# Patient Record
Sex: MALE | Race: OTHER | HISPANIC OR LATINO
[De-identification: names, ages, dates, MRNs, and addresses within clinical notes are randomized per-mention and may not be internally consistent; named-entity substitution may affect disease eponyms.]

---

## 2019-04-29 ENCOUNTER — APPOINTMENT (OUTPATIENT)
Dept: ORTHOPEDIC SURGERY | Facility: CLINIC | Age: 44
End: 2019-04-29
Payer: MEDICAID

## 2019-04-29 VITALS
HEART RATE: 76 BPM | HEIGHT: 67 IN | BODY MASS INDEX: 31.39 KG/M2 | DIASTOLIC BLOOD PRESSURE: 90 MMHG | SYSTOLIC BLOOD PRESSURE: 146 MMHG | WEIGHT: 200 LBS

## 2019-04-29 DIAGNOSIS — M54.16 RADICULOPATHY, LUMBAR REGION: ICD-10-CM

## 2019-04-29 DIAGNOSIS — Z87.09 PERSONAL HISTORY OF OTHER DISEASES OF THE RESPIRATORY SYSTEM: ICD-10-CM

## 2019-04-29 DIAGNOSIS — Z86.39 PERSONAL HISTORY OF OTHER ENDOCRINE, NUTRITIONAL AND METABOLIC DISEASE: ICD-10-CM

## 2019-04-29 DIAGNOSIS — Z83.3 FAMILY HISTORY OF DIABETES MELLITUS: ICD-10-CM

## 2019-04-29 PROCEDURE — 72100 X-RAY EXAM L-S SPINE 2/3 VWS: CPT

## 2019-04-29 PROCEDURE — 99203 OFFICE O/P NEW LOW 30 MIN: CPT

## 2019-04-29 PROCEDURE — 73502 X-RAY EXAM HIP UNI 2-3 VIEWS: CPT | Mod: RT

## 2019-04-29 RX ORDER — DICLOFENAC POTASSIUM 50 MG/1
50 TABLET, COATED ORAL 3 TIMES DAILY
Qty: 60 | Refills: 0 | Status: ACTIVE | COMMUNITY
Start: 2019-04-29 | End: 1900-01-01

## 2019-04-29 NOTE — DISCUSSION/SUMMARY
[de-identified] : 43-year-old male with likely acute radiculopathy right LE. Overall symptoms are improving. Options discussed. Prescribed course of diclofenac, side effects discussed. Recommended physical therapy. He will follow up with Dr. White after a course of medicine and therapy if the pain is not improving. All questions answered he expresses understanding

## 2019-04-29 NOTE — PHYSICAL EXAM
[de-identified] : General Exam\par \par Well developed, well nourished\par No apparent distress\par Oriented to person, place, and time\par Mood: Normal\par Affect: Normal\par Balance and coordination: Normal\par Gait: Normal\par \par Right hip exam\par \par Skin: Clean/dry and intact\par Inspection: No obvious deformity, no swelling, no ecchymosis.\par Tenderness:  no tenderness over greater trochanter/glut medius insertion. No tenderness pubic symphysis, pubic tubercle, hip flexors. No ttp ischial tuberosity or buttock. No ttp over the ASIS/Illiac crest.\par ROM: 0-120°. Internal rotation 30 external rotation 70\par Painful ROM: None\par Additional tests: No pain with circumduction negative impingement test at 90° mildly positive impingement test at 60° negative [facility]\par Strength: 5/5 hip flexion/ADD/ABD/Q/H/TA/GS/EHL\par Neuro: Sensation in tact to light touch throughout in dp/sp/tib/carlos manuel/saph distributions\par Pulses: 2+ DP/PT pulses\par \par Lumbar Spine Exam\par \par Inspection: No lesions, no obvious deformity\par Palpation: No midline tenderness palpation, step-offs, or skin lesions. No tenderness to palpation of the sciatic notch bilaterally. no Paraspinal tenderness bilaterally\par ROM: no restricted range of motion with respect to flexion, extension, lateral bending, and rotation. \par Strength: 5/5 IP/hip abductors/hip adductors/Q/H/EHL/TA/GS\par Hip ROM: No pain with passive internal/external rotation of the hips. \par Other tests:neg straight leg raise on the left negative femoral stretch \par Sensation: Intact sensation to light touch bilateral lower extremities in L2-S1 distributions. \par Reflexes: 2+ patellar and reflexes. Downgoing\par Babinski. \par Pulses: 2+ DP and PT pulses\par  [de-identified] : 2 views L spine.  Preliminary report- no acute fx seen\par AP Pelvis lateral R hip.  Preliminary report- no acute fx seen, no degenerative changes\par

## 2019-04-29 NOTE — HISTORY OF PRESENT ILLNESS
[de-identified] : 43-year-old male presents complaining of right hip and leg pain for 9-10 days. No injuries or change in activity. He feels the pain lateral aspect right hip that radiates down his entire leg to his ankle. He describes the pain is deep inside. He has taken Advil with some relief. Massage makes it feel better. No weakness noted. Denies numbness tingling\par \par The patient's past medical history, past surgical history, medications, allergies, and social history were reviewed by me today with the patient and documented accordingly. In addition, the patient's family history, which is noncontributory to this visit, was also reviewed.\par

## 2019-05-02 PROBLEM — M54.16 RIGHT LUMBAR RADICULOPATHY: Status: ACTIVE | Noted: 2019-04-29

## 2019-05-20 ENCOUNTER — MEDICATION RENEWAL (OUTPATIENT)
Age: 44
End: 2019-05-20

## 2019-06-06 ENCOUNTER — RX RENEWAL (OUTPATIENT)
Age: 44
End: 2019-06-06

## 2019-07-08 ENCOUNTER — RX RENEWAL (OUTPATIENT)
Age: 44
End: 2019-07-08

## 2019-08-15 ENCOUNTER — RX RENEWAL (OUTPATIENT)
Age: 44
End: 2019-08-15

## 2019-09-20 ENCOUNTER — RX RENEWAL (OUTPATIENT)
Age: 44
End: 2019-09-20

## 2019-10-28 ENCOUNTER — RX RENEWAL (OUTPATIENT)
Age: 44
End: 2019-10-28

## 2019-10-29 ENCOUNTER — RX RENEWAL (OUTPATIENT)
Age: 44
End: 2019-10-29

## 2019-11-20 ENCOUNTER — RX RENEWAL (OUTPATIENT)
Age: 44
End: 2019-11-20

## 2019-12-16 ENCOUNTER — RX RENEWAL (OUTPATIENT)
Age: 44
End: 2019-12-16

## 2020-01-13 ENCOUNTER — RX RENEWAL (OUTPATIENT)
Age: 45
End: 2020-01-13

## 2020-01-13 RX ORDER — DICLOFENAC SODIUM 50 MG/1
50 TABLET, DELAYED RELEASE ORAL
Qty: 60 | Refills: 0 | Status: ACTIVE | COMMUNITY
Start: 2019-05-20 | End: 1900-01-01

## 2022-11-17 ENCOUNTER — NON-APPOINTMENT (OUTPATIENT)
Age: 47
End: 2022-11-17

## 2022-11-17 ENCOUNTER — APPOINTMENT (OUTPATIENT)
Dept: ORTHOPEDIC SURGERY | Facility: CLINIC | Age: 47
End: 2022-11-17
Payer: MEDICAID

## 2022-11-17 DIAGNOSIS — M79.642 PAIN IN LEFT HAND: ICD-10-CM

## 2022-11-17 PROCEDURE — 73130 X-RAY EXAM OF HAND: CPT | Mod: LT

## 2022-11-17 PROCEDURE — 99203 OFFICE O/P NEW LOW 30 MIN: CPT | Mod: 25

## 2022-11-17 PROCEDURE — 29075 APPL CST ELBW FNGR SHORT ARM: CPT | Mod: LT

## 2022-11-17 RX ORDER — IBUPROFEN 600 MG/1
600 TABLET, FILM COATED ORAL
Qty: 60 | Refills: 0 | Status: ACTIVE | COMMUNITY
Start: 2022-11-17 | End: 1900-01-01

## 2022-11-17 NOTE — ADDENDUM
[FreeTextEntry1] : I, Holly Quigley wrote this note acting as a scribe for Dr. Sherwin Espino on Nov 17, 2022.

## 2022-11-17 NOTE — END OF VISIT
[FreeTextEntry3] : All medical record entries made by the Scribe were at my,  Dr. Sherwin Espino MD., direction and personally dictated by me on 11/17/2022. I have personally reviewed the chart and agree that the record accurately reflects my personal performance of the history, physical exam, assessment and plan.

## 2022-11-17 NOTE — HISTORY OF PRESENT ILLNESS
[de-identified] : Patient is a RHD 48 y/o male who presents with pain in the left hand post injury on 11/10/22. Patient reports he punched a wall, and as a result sustained fracture in the left small finger. He states he went to [facility] and then [facility] ER, where x-rays were done on 11/11/22 and he was diagnosed with left 5th MCP fracture. He does not have them available for review today. He presents in ulnar gutter splint.

## 2022-11-17 NOTE — RETURN TO WORK/SCHOOL
[FreeTextEntry1] : Mr. PALLAVI LARES was seen in the office today on 11/17/2022 and evaluated by me for an Orthopedic visit. Please be advised that he will remain out of work until 11/28/2022 due to a left hand fracture. [FreeTextEntry2] : Dr. Sherwin Espino M.D. on 11/17/2022.

## 2022-11-17 NOTE — PHYSICAL EXAM
[de-identified] : Patient is WDWN, alert, and in no acute distress. Breathing is unlabored. He is grossly oriented to person, place, and time.\par \par Left Hand:\par He presents in an ulnar gutter splint, which was removed for physical exam.\par There is edema noted dorsally and ulnarly along the left hand.\par He has focal tenderness along the shaft of the fifth metacarpal in the region of the fracture.\par He cannot flex the little finger into the palm due to injury and pain. Limitations of motion at the left little finger MCP joint. \par Full digital motion otherwise.\par Sensation to the digits is intact distally. [de-identified] : AP, lateral and oblique views of the LEFT hand were obtained today and revealed a displaced and obliquely oriented fracture through the fifth metacarpal shaft.

## 2022-11-17 NOTE — DISCUSSION/SUMMARY
[de-identified] : The underlying pathophysiology was reviewed with the patient. XR films were reviewed with the patient. Discussed at length the nature of the patient’s condition. The left hand symptoms appear secondary to fifth metacarpal fracture. \par \par At this time, we discussed options including operative and nonoperative management. I told him that unless he prefers surgery, which would consist of ORIF, I would not recommend it given the nature of the injury as documented above. He is in agreement and has deferred surgery in lieu of cast immobilization. I did tel him however, that he may notice "drooping of the knuckle"/a loss of prominence of the metacarpal head given the injury however this is will not effect the overall function of his hand.\par \par He was placed into a left short arm cast in the office today on 11/17/22.\par Proper cast care instructions were reviewed with the patient.\par He was instructed on ROM exercises of the digits while casted.\par He was advised that he may use the hand for ADLs while casted.\par I recommended elevation and pumping of the hand to reduce edema if the cast begins to tighten.\par NSAIDs prn.\par \par RX: Ibuprofen 600g, BID (30 tablets).\par \par All questions answered, understanding verbalized. Patient in agreement with plan of care. Follow up in 4 weeks for cast removal and repeat xrays.

## 2022-12-15 ENCOUNTER — APPOINTMENT (OUTPATIENT)
Dept: ORTHOPEDIC SURGERY | Facility: CLINIC | Age: 47
End: 2022-12-15

## 2022-12-15 PROCEDURE — 73130 X-RAY EXAM OF HAND: CPT | Mod: LT

## 2022-12-15 PROCEDURE — 99213 OFFICE O/P EST LOW 20 MIN: CPT

## 2022-12-15 NOTE — PHYSICAL EXAM
[de-identified] : Patient is WDWN, alert, and in no acute distress. Breathing is unlabored. He is grossly oriented to person, place, and time.\par \par Left Hand:\par He presents in a left short arm cast which was removed in the office today on 12/15/22.\par There is resolved edema dorsally and ulnarly along the left hand.\par He is no longer tender along the shaft of the fifth metacarpal in the region of the fracture.\par He can flex with digits into the palm, forming a tight clenched fist which is improved as compared to his initial office visit 4 weeks ago.\par Full digital motion otherwise.\par Sensation to the digits is intact distally. [de-identified] : AP, lateral and oblique views of the LEFT hand were obtained today and revealed a displaced and obliquely oriented fracture through the fifth metacarpal shaft. The fracture is healing.

## 2022-12-15 NOTE — DISCUSSION/SUMMARY
[de-identified] : The underlying pathophysiology was reviewed with the patient. XR films were reviewed with the patient. Discussed at length the nature of the patient’s condition. The left hand symptoms appear secondary to fifth metacarpal fracture. \par \par At this time, the left short arm cast was removed in the office today on 12/15/22. He was instructed to use a wrist brace with any heavy work/lifting as the fracture is not yet fully healed but is healed enough that he does not require another cast. He was instructed to soak the hand in warm water and Epsom salts and perform ROM exercises of the digits. I recommended he use the hand for ADLs as tolerated to aide in regaining function and motion.\par \par RX: Left carpal tunnel splint. \par \par All questions answered, understanding verbalized. Patient in agreement with plan of care. Follow up in 6 weeks for repeat xrays, if needed.

## 2022-12-15 NOTE — ADDENDUM
[FreeTextEntry1] : I, Holly Quigley wrote this note acting as a scribe for Dr. Sherwin Espino on Dec 15, 2022.

## 2022-12-15 NOTE — END OF VISIT
[FreeTextEntry3] : All medical record entries made by the Scribe were at my,  Dr. Sherwin Espino MD., direction and personally dictated by me on 12/15/2022. I have personally reviewed the chart and agree that the record accurately reflects my personal performance of the history, physical exam, assessment and plan.

## 2022-12-15 NOTE — HISTORY OF PRESENT ILLNESS
[de-identified] : Patient is a RHD 48 y/o male who presents with pain in the left hand post injury on 11/10/22. Patient reports he punched a wall, and as a result sustained fracture in the left small finger. He states he went to [facility] and then [facility] ER, where x-rays were done on 11/11/22 and he was diagnosed with left 5th MCP fracture. He does not have them available for review today. He presents in ulnar gutter splint. He was seen initially in the office on 11/17/22 at which time he was placed into a short arm cast. He returns for cast removal and repeat xrays on 12/15/22. He is notes mild residual soreness at the end of the day when working but overall is doing well.\par \par He no longer smokes cigarettes. He states he stopped on Thanksgiving.

## 2023-02-06 ENCOUNTER — APPOINTMENT (OUTPATIENT)
Dept: ORTHOPEDIC SURGERY | Facility: CLINIC | Age: 48
End: 2023-02-06
Payer: MEDICAID

## 2023-02-06 PROCEDURE — 73130 X-RAY EXAM OF HAND: CPT | Mod: LT

## 2023-02-06 PROCEDURE — 99213 OFFICE O/P EST LOW 20 MIN: CPT

## 2023-02-06 NOTE — END OF VISIT
[FreeTextEntry3] : All medical record entries made by the Scribe were at my,  Dr. Sherwin Espino MD., direction and personally dictated by me on 02/06/2023. I have personally reviewed the chart and agree that the record accurately reflects my personal performance of the history, physical exam, assessment and plan.

## 2023-02-06 NOTE — DISCUSSION/SUMMARY
[de-identified] : The underlying pathophysiology was reviewed with the patient. XR films were reviewed with the patient. Discussed at length the nature of the patient’s condition. The left hand symptoms appear secondary to fifth metacarpal fracture. The left hand/wrist symptoms are secondary to carpal tunnel syndrome.\par \par With regard to the left fifth metacarpal fracture, I did tell him that the prominence along the metacarpal shaft dorsally is due to position that the fracture healed in as it was displaced. Again, we spoke on treatment options and I told him that if he is grossly bothered by the appearance of the hand and feels function deficits, he can always undergo a corrective osteotomy, which he deferred. I advised him to attend hand therapy to improve motion and function.\par The patient wishes to proceed with hand therapy of the left hand. A script was given.\par \par With regard to the left hand numbness and tingling, I recommended use of a wrist brace as well as an EMG for further evaluation. \par An EMG/NCV test of the upper extremities was ordered to evaluate for left carpal tunnel syndrome.. Patient will follow-up to review the results and discuss further treatment recommendations.\par \par Finally, with regard to the right elbow, I recommended use of oral and topical antiinflammatories. He was instructed on stretching exercises of the wrist and forearm as well as to attend physical therapy.\par The patient wishes to proceed with physical therapy of the right elbow. A script was given.\par \par All questions answered, understanding verbalized. Patient in agreement with plan of care. Follow up to review EMG results.

## 2023-02-06 NOTE — HISTORY OF PRESENT ILLNESS
[de-identified] : Patient is a RHD 46 y/o male who presents with pain in the left hand post injury on 11/10/22. Patient reports he punched a wall, and as a result sustained fracture in the left small finger. He states he went to [facility] and then [facility] ER, where x-rays were done on 11/11/22 and he was diagnosed with left 5th MCP fracture. He does not have them available for review today. He presents in ulnar gutter splint. He was seen initially in the office on 11/17/22 at which time he was placed into a short arm cast. He returned for cast removal and repeat xrays on 12/15/22. He noted mild residual soreness at the end of the day when working but overall was doing well. He again returns for repeat xrays on 2/6/23. He notes he is concerned about the appearance of the hand, as there is a prominence dorsally. He also states the finger feels a bit stiff. He also complains of numbness to the digits of his left hand along the median nerve distribution. He is not woken up at night by his symptoms. \par \par He additionally complains of right elbow pain which began a few weeks ago. He denies injury. He localizes his symptoms laterally to the elbow. He states his job requires repetitive work and motion.\par \par He no longer smokes cigarettes. He states he stopped on Thanksgiving.

## 2023-02-06 NOTE — PHYSICAL EXAM
[de-identified] : Patient is WDWN, alert, and in no acute distress. Breathing is unlabored. He is grossly oriented to person, place, and time.\par \par Left Hand:\par There is resolved edema dorsally and ulnarly along the left hand.\par He is no longer tender along the shaft of the fifth metacarpal in the region of the fracture.\par There is a prominence dorsally along the shaft of the fifth metacarpal. \par He can flex with digits into the palm.\par Full digital motion otherwise.\par Phalen's Test: Positive\par Tinel's Test: Negative\par No thenar atrophy.\par \par Right Elbow:\par Inspection/Palpation: Point tenderness at ECRB insertion into lateral epicondyle. \par Range of Motion: Pain elicited with resisted wrist extension with elbow fully extended, resisted extension of the long fingers, maximal flexion of the wrist, passive wrist flexion in pronation. \par Strength: Flexion and extension 5/5\par Stability: No joint instability on provocative testing. No skin lesions or discoloration.	  [de-identified] : AP, lateral and oblique views of the LEFT hand were obtained today and revealed a displaced and obliquely oriented fracture through the fifth metacarpal shaft. The fracture is healed.

## 2023-02-06 NOTE — ADDENDUM
[FreeTextEntry1] : I, Holly Quigley wrote this note acting as a scribe for Dr. Sherwin Espino on Feb 06, 2023.

## 2023-03-30 ENCOUNTER — APPOINTMENT (OUTPATIENT)
Dept: ORTHOPEDIC SURGERY | Facility: CLINIC | Age: 48
End: 2023-03-30

## 2023-05-12 ENCOUNTER — OFFICE (OUTPATIENT)
Dept: URBAN - METROPOLITAN AREA CLINIC 104 | Facility: CLINIC | Age: 48
Setting detail: OPHTHALMOLOGY
End: 2023-05-12
Payer: COMMERCIAL

## 2023-05-12 DIAGNOSIS — E11.9: ICD-10-CM

## 2023-05-12 DIAGNOSIS — H43.813: ICD-10-CM

## 2023-05-12 DIAGNOSIS — H35.363: ICD-10-CM

## 2023-05-12 PROCEDURE — 92235 FLUORESCEIN ANGRPH MLTIFRAME: CPT | Performed by: SPECIALIST

## 2023-05-12 PROCEDURE — 92134 CPTRZ OPH DX IMG PST SGM RTA: CPT | Performed by: SPECIALIST

## 2023-05-12 PROCEDURE — 92012 INTRM OPH EXAM EST PATIENT: CPT | Performed by: SPECIALIST

## 2023-05-12 ASSESSMENT — VISUAL ACUITY
OD_BCVA: 20/25+1
OS_BCVA: 20/20

## 2023-05-12 ASSESSMENT — CONFRONTATIONAL VISUAL FIELD TEST (CVF)
OD_FINDINGS: FULL
OS_FINDINGS: FULL

## 2023-08-17 ENCOUNTER — APPOINTMENT (OUTPATIENT)
Dept: ORTHOPEDIC SURGERY | Facility: CLINIC | Age: 48
End: 2023-08-17
Payer: MEDICAID

## 2023-08-17 VITALS — WEIGHT: 210 LBS | BODY MASS INDEX: 32.96 KG/M2 | HEIGHT: 67 IN

## 2023-08-17 DIAGNOSIS — M77.11 LATERAL EPICONDYLITIS, RIGHT ELBOW: ICD-10-CM

## 2023-08-17 DIAGNOSIS — S62.307A UNSPECIFIED FRACTURE OF FIFTH METACARPAL BONE, LEFT HAND, INITIAL ENCOUNTER FOR CLOSED FRACTURE: ICD-10-CM

## 2023-08-17 PROCEDURE — 73130 X-RAY EXAM OF HAND: CPT | Mod: LT

## 2023-08-17 PROCEDURE — 99213 OFFICE O/P EST LOW 20 MIN: CPT | Mod: 25

## 2023-08-17 PROCEDURE — 73080 X-RAY EXAM OF ELBOW: CPT | Mod: RT

## 2023-08-17 NOTE — HISTORY OF PRESENT ILLNESS
[de-identified] : Pt is a RHD 48 y/o male with right elbow pain. He localizes his pain laterally. He complains of associated swelling. He has tried using ice and oral antiinflammatories as well as a tennis elbow strap, all without long term relief. He states he has tried modifying his activity, which did somewhat help, however in his line of work, he has to use his hands and cannot rest.   He additionally has been treated for a left fifth metacarpal fracture. The injury was on 11/10/22. Patient reports he punched a wall, and as a result sustained fracture in the left small finger. He states he went to [facility] and then [facility] ER, where x-rays were done on 11/11/22 and he was diagnosed with left 5th MCP fracture. He was casted for 4 weeks and the cast was removed in the office on 12/15/23. He is progressing and notes full motion. He does state he intermittently experiences swelling, however overall has no deficits.

## 2023-08-17 NOTE — END OF VISIT
[FreeTextEntry3] : All medical record entries made by the Scribe were at my,  Dr. Sherwin Espino MD., direction and personally dictated by me on 08/17/2023. I have personally reviewed the chart and agree that the record accurately reflects my personal performance of the history, physical exam, assessment and plan.

## 2023-08-17 NOTE — DISCUSSION/SUMMARY
[de-identified] : The underlying pathophysiology was reviewed with the patient. XR films were reviewed with the patient. Discussed at length the nature of the patients condition. The left hand symptoms appear secondary to fifth metacarpal fracture. The right elbow symptom are secondary to lateral epicondylitis.  With regard to the left hand, I did again tell him that the prominence along the metacarpal shaft dorsally is due to position that the fracture healed in as it was displaced. He was instructed to continue with all activities and may advance his activities as tolerated.   With regard to the right elbow, I recommended he begin a course of PT for ROM, stretching and strengthening. I advised use of an oral and topical antiinflammatory as well as a tennis elbow strap. I explained to him that I prefer to save cortisone injections as a last resort as there is a lifetime limit of 3 injections and the soft tissue structures can be further weakened by the steroid injection. I recommended use of an oral antiinflammatory and a course of PT.   The patient wishes to proceed with physical therapy of the right elbow. A script was given. RX: Voltaren 75mg, BID (60 tablets).  All questions answered, understanding verbalized. Patient in agreement with plan of care. Follow up as needed.

## 2023-08-17 NOTE — ADDENDUM
[FreeTextEntry1] : I, Holly Quigley wrote this note acting as a scribe for Dr. Sherwin Espino on Aug 17, 2023.

## 2023-08-17 NOTE — PHYSICAL EXAM
[de-identified] : Patient is WDWN, alert, and in no acute distress. Breathing is unlabored. He is grossly oriented to person, place, and time.  Left Hand: There is resolved edema dorsally and ulnarly along the left hand. He is no longer tender along the shaft of the fifth metacarpal in the region of the fracture. There is a prominence dorsally along the shaft of the fifth metacarpal. He can flex with digits into the palm. Full digital motion otherwise. Phalen's Test: Positive Tinel's Test: Negative No thenar atrophy.  Right Elbow: Inspection/Palpation: Point tenderness at ECRB insertion into lateral epicondyle. Range of Motion: Pain elicited with resisted wrist extension with elbow fully extended, resisted extension of the long fingers, maximal flexion of the wrist, passive wrist flexion in pronation. Strength: Flexion and extension 5/5 Stability: No joint instability on provocative testing. No skin lesions or discoloration. [de-identified] : AP, lateral and oblique views of the LEFT hand were obtained today and revealed a displaced and obliquely oriented fracture through the fifth metacarpal shaft. The fracture is healed.  AP, lateral and oblique views of the RIGHT elbow were obtained today and revealed no abnormalities. No acute fracture. No dislocation. Cartilage spaces are maintained.

## 2024-01-22 ENCOUNTER — RX RENEWAL (OUTPATIENT)
Age: 49
End: 2024-01-22

## 2024-05-23 ENCOUNTER — APPOINTMENT (OUTPATIENT)
Dept: ORTHOPEDIC SURGERY | Facility: CLINIC | Age: 49
End: 2024-05-23
Payer: COMMERCIAL

## 2024-05-23 VITALS
HEART RATE: 89 BPM | DIASTOLIC BLOOD PRESSURE: 85 MMHG | WEIGHT: 200 LBS | BODY MASS INDEX: 31.39 KG/M2 | SYSTOLIC BLOOD PRESSURE: 122 MMHG | HEIGHT: 67 IN

## 2024-05-23 PROCEDURE — 99203 OFFICE O/P NEW LOW 30 MIN: CPT | Mod: 25

## 2024-05-23 PROCEDURE — 73502 X-RAY EXAM HIP UNI 2-3 VIEWS: CPT | Mod: RT

## 2024-05-29 NOTE — DISCUSSION/SUMMARY
[de-identified] : At this time, due to trochanteric bursitis of the right hip, I recommend a course of physical therapy and a topical anti-inflammatory. He will be reassessed in four weeks. We will discuss the potential for a cortisone injection if he gets approval from his primary care doctor, secondary to his diabetes. Of note, because of the history of sciatica, he is being referred for a spine evaluation.

## 2024-05-29 NOTE — PHYSICAL EXAM
[de-identified] : Ambulating with a slightly antalgic to antalgic gait.  Station:  Normal.  [de-identified] : Right Hip:  Range of Motion in Degrees: 	                                                     Claimant:	          Normal:	 Flexion (Active) 	                               120 	            120-degrees	 Flexion (Passive)	                               120	            120-degrees	 Extension (Active)	                               -30	             -30-degrees	 Extension (Passive)	                       -30	             -30-degrees	 Abduction (Active)	                           45-50	           [id number]	 Abduction (Passive)	                   45-50	           24-69-[id number]	 Adduction (Active)                	           20-30	           08-51-zbazqej	 Adduction (Passive)	                   20-30	           [id number]	 Internal Rotation (Active) 	             35	            35-degrees	 Internal Rotation (Passive)	             35	            35-degrees	 External Rotation (Active)	             45	            45-degrees	 External Rotation (Passive)	             45	            45-degrees	  No tenderness with internal or external rotation or axial load.  Point tenderness over the greater trochanter with pain with resisted abduction.  Negative Trendelenburg.  No weakness to flexion, extension, abduction or adduction.  No evidence of instability.  No motor or sensory deficits.  2+ DP and PT pulses.  Skin is intact.  No scars, rashes or lesions.    Left Hip:  Range of Motion in Degrees: 	                                                     Claimant:	          Normal:	 Flexion (Active) 	                               120 	            120-degrees	 Flexion (Passive)	                               120	            120-degrees	 Extension (Active)	                               -30	             -30-degrees	 Extension (Passive)	                       -30	             -30-degrees	 Abduction (Active)	                           45-50	           45-46-[id number]	 Abduction (Passive)	                   45-50	           62-73-tvynold	 Adduction (Active)                	           20-30	           08-69-[id number]	 Adduction (Passive)	                   20-30	           50-04-gyupbsq	 Internal Rotation (Active) 	             35	            35-degrees	 Internal Rotation (Passive)	             35	            35-degrees	 External Rotation (Active)	             45	            45-degrees	 External Rotation (Passive)	             45	            45-degrees	  No tenderness with internal or external rotation or axial load.  No tenderness to palpation over the greater trochanter.  Negative Trendelenburg.  No tenderness with resisted abduction.  No weakness to flexion, extension, abduction or adduction.  No evidence of instability.  No motor or sensory deficits.  2+ DP and PT pulses.  Skin is intact.  No scars, rashes or lesions.     [de-identified] : Appearance:  Well-developed, well-nourished male in no acute distress.   [de-identified] : Radiographs, which were taken in the office today, two-three views of the right hip, including the pelvis, show no obvious osseous abnormality.

## 2024-05-29 NOTE — CONSULT LETTER
[Dear  ___] : Dear  [unfilled], [FreeTextEntry1] :  I am referring Randall Jarvis to you for further evaluation.  Thank you very much for seeing this patient. My most recent evaluation follows.  If you have any questions, please do not hesitate to contact me.  Sincerely,  Catarino Kelley III, MD [facility]/  cc: Dr. Donna Luis

## 2024-05-29 NOTE — ADDENDUM
[FreeTextEntry1] : This note was written by Gris Singh on 05/29/2024 acting as a scribe for SOPHIE ATKINSON III, MD

## 2024-05-29 NOTE — HISTORY OF PRESENT ILLNESS
[de-identified] : The patient comes in today with complaints of pain to his right hip. He points to the lateral aspect of his hip as to the source of his pain. He also states he has sciatica with pain radiating down his leg. The patient states the onset/injury occurred on 5/13/2024. This injury is not work related or due to an automobile accident. The patient describes the pain as sharp, achy, burning, throbbing and shooting. The patient notes rest and medication make his symptoms better, while walking and lying down makes his symptoms worse. The patient indicates a pain level of 7-8 on a pain scale of 0-10.

## 2024-06-20 ENCOUNTER — APPOINTMENT (OUTPATIENT)
Dept: ORTHOPEDIC SURGERY | Facility: CLINIC | Age: 49
End: 2024-06-20

## 2024-06-20 VITALS
HEART RATE: 81 BPM | WEIGHT: 195 LBS | BODY MASS INDEX: 30.61 KG/M2 | HEIGHT: 67 IN | SYSTOLIC BLOOD PRESSURE: 126 MMHG | DIASTOLIC BLOOD PRESSURE: 89 MMHG

## 2024-06-20 DIAGNOSIS — M70.61 TROCHANTERIC BURSITIS, RIGHT HIP: ICD-10-CM

## 2024-06-20 PROCEDURE — 99212 OFFICE O/P EST SF 10 MIN: CPT

## 2024-06-25 PROBLEM — M70.61 TROCHANTERIC BURSITIS OF RIGHT HIP: Status: ACTIVE | Noted: 2024-05-23

## 2024-07-02 ENCOUNTER — RX RENEWAL (OUTPATIENT)
Age: 49
End: 2024-07-02